# Patient Record
Sex: MALE | Race: WHITE | NOT HISPANIC OR LATINO | Employment: FULL TIME | URBAN - METROPOLITAN AREA
[De-identification: names, ages, dates, MRNs, and addresses within clinical notes are randomized per-mention and may not be internally consistent; named-entity substitution may affect disease eponyms.]

---

## 2020-05-12 ENCOUNTER — OFFICE VISIT (OUTPATIENT)
Dept: BARIATRICS/WEIGHT MGMT | Facility: CLINIC | Age: 30
End: 2020-05-12

## 2020-05-12 ENCOUNTER — TELEPHONE (OUTPATIENT)
Dept: BARIATRICS/WEIGHT MGMT | Facility: CLINIC | Age: 30
End: 2020-05-12

## 2020-05-12 VITALS — HEIGHT: 68 IN | BODY MASS INDEX: 21.37 KG/M2 | WEIGHT: 141 LBS

## 2020-05-12 NOTE — PROGRESS NOTES
"[facility]   [address] [place] [address]   [address] [place] [address]  Dept: [phone]  2020      Aureliano Tran.  [id number]  [id number]  1990  male      The patient is here for healthy eating and muscle gain. Patient is working on eliminating fast foods, fried foods, sweets and soda.  Aureliano Tran has been increasing his daily water intake. He has been exercisin-6 months ago kickboxing and walking.    24 hour recall  Breakfast: 1/2 cup oats with blueberry and banana and water  Lunch: Baked chicken (3 oz)  breast and broccoli and cauliflower  Dinner: Rice with ground beef with onions and tomato  Snacks: Banana  Drink: Water, Black Coffee, Protein Shake-handful spinach and protein powder (27 gm Protein 1 scoop-/)     Measures   Waist: 32.5\"   Neck: 13\"  Chest: 35\"   Belly: 35.5\"       Goals:  Eating ~2000 kcal per day and 96 gm Protein. 150 minutes of exercise per week including 2 days of weight bearing exercise. Limiting eating out to 1x per week. Continue water drinking.     Discussion/Plan:    See about.       The patient was given written materials from our office for education.   I answered all of the patients questions regarding dietary changes, exercise or surgical options.  Future Appointments   Date Time Provider Department Center   2020  7:45 AM Vania Brice RD [facility] None     The patient will follow up in one month on.      The total time spent face to face was 20 minutes with 20 minutes spent counseling.        "